# Patient Record
Sex: FEMALE | Race: WHITE | NOT HISPANIC OR LATINO | Employment: OTHER | URBAN - METROPOLITAN AREA
[De-identification: names, ages, dates, MRNs, and addresses within clinical notes are randomized per-mention and may not be internally consistent; named-entity substitution may affect disease eponyms.]

---

## 2019-08-25 PROBLEM — I10 HTN (HYPERTENSION): Status: ACTIVE | Noted: 2019-08-25

## 2019-08-25 PROBLEM — I34.1 MITRAL VALVE PROLAPSE: Status: ACTIVE | Noted: 2019-08-25

## 2019-08-25 PROBLEM — I48.91 ATRIAL FIBRILLATION WITH RVR: Status: ACTIVE | Noted: 2019-08-25

## 2021-05-12 ENCOUNTER — PATIENT MESSAGE (OUTPATIENT)
Dept: RESEARCH | Facility: HOSPITAL | Age: 61
End: 2021-05-12

## 2023-03-07 ENCOUNTER — OFFICE VISIT (OUTPATIENT)
Dept: PAIN MEDICINE | Facility: CLINIC | Age: 63
End: 2023-03-07
Payer: COMMERCIAL

## 2023-03-07 VITALS
HEART RATE: 52 BPM | SYSTOLIC BLOOD PRESSURE: 158 MMHG | BODY MASS INDEX: 27.75 KG/M2 | HEIGHT: 67 IN | DIASTOLIC BLOOD PRESSURE: 87 MMHG | WEIGHT: 176.81 LBS

## 2023-03-07 DIAGNOSIS — M54.2 CERVICALGIA: Primary | ICD-10-CM

## 2023-03-07 DIAGNOSIS — M47.812 CERVICAL SPONDYLOSIS: ICD-10-CM

## 2023-03-07 PROCEDURE — 99999 PR PBB SHADOW E&M-EST. PATIENT-LVL IV: ICD-10-PCS | Mod: PBBFAC,,, | Performed by: PHYSICIAN ASSISTANT

## 2023-03-07 PROCEDURE — 3079F DIAST BP 80-89 MM HG: CPT | Mod: CPTII,S$GLB,, | Performed by: PHYSICIAN ASSISTANT

## 2023-03-07 PROCEDURE — 99999 PR PBB SHADOW E&M-EST. PATIENT-LVL IV: CPT | Mod: PBBFAC,,, | Performed by: PHYSICIAN ASSISTANT

## 2023-03-07 PROCEDURE — 1159F PR MEDICATION LIST DOCUMENTED IN MEDICAL RECORD: ICD-10-PCS | Mod: CPTII,S$GLB,, | Performed by: PHYSICIAN ASSISTANT

## 2023-03-07 PROCEDURE — 99204 PR OFFICE/OUTPT VISIT, NEW, LEVL IV, 45-59 MIN: ICD-10-PCS | Mod: S$GLB,,, | Performed by: PHYSICIAN ASSISTANT

## 2023-03-07 PROCEDURE — 4010F PR ACE/ARB THEARPY RXD/TAKEN: ICD-10-PCS | Mod: CPTII,S$GLB,, | Performed by: PHYSICIAN ASSISTANT

## 2023-03-07 PROCEDURE — 4010F ACE/ARB THERAPY RXD/TAKEN: CPT | Mod: CPTII,S$GLB,, | Performed by: PHYSICIAN ASSISTANT

## 2023-03-07 PROCEDURE — 3079F PR MOST RECENT DIASTOLIC BLOOD PRESSURE 80-89 MM HG: ICD-10-PCS | Mod: CPTII,S$GLB,, | Performed by: PHYSICIAN ASSISTANT

## 2023-03-07 PROCEDURE — 3008F PR BODY MASS INDEX (BMI) DOCUMENTED: ICD-10-PCS | Mod: CPTII,S$GLB,, | Performed by: PHYSICIAN ASSISTANT

## 2023-03-07 PROCEDURE — 99204 OFFICE O/P NEW MOD 45 MIN: CPT | Mod: S$GLB,,, | Performed by: PHYSICIAN ASSISTANT

## 2023-03-07 PROCEDURE — 3008F BODY MASS INDEX DOCD: CPT | Mod: CPTII,S$GLB,, | Performed by: PHYSICIAN ASSISTANT

## 2023-03-07 PROCEDURE — 1160F RVW MEDS BY RX/DR IN RCRD: CPT | Mod: CPTII,S$GLB,, | Performed by: PHYSICIAN ASSISTANT

## 2023-03-07 PROCEDURE — 1159F MED LIST DOCD IN RCRD: CPT | Mod: CPTII,S$GLB,, | Performed by: PHYSICIAN ASSISTANT

## 2023-03-07 PROCEDURE — 1160F PR REVIEW ALL MEDS BY PRESCRIBER/CLIN PHARMACIST DOCUMENTED: ICD-10-PCS | Mod: CPTII,S$GLB,, | Performed by: PHYSICIAN ASSISTANT

## 2023-03-07 PROCEDURE — 3077F SYST BP >= 140 MM HG: CPT | Mod: CPTII,S$GLB,, | Performed by: PHYSICIAN ASSISTANT

## 2023-03-07 PROCEDURE — 3077F PR MOST RECENT SYSTOLIC BLOOD PRESSURE >= 140 MM HG: ICD-10-PCS | Mod: CPTII,S$GLB,, | Performed by: PHYSICIAN ASSISTANT

## 2023-03-07 RX ORDER — DRONEDARONE 400 MG/1
1 TABLET, FILM COATED ORAL 2 TIMES DAILY
COMMUNITY
Start: 2022-09-16 | End: 2023-04-18

## 2023-03-07 RX ORDER — OLMESARTAN MEDOXOMIL 40 MG/1
40 TABLET ORAL
COMMUNITY
Start: 2023-02-16

## 2023-03-07 RX ORDER — METHOCARBAMOL 500 MG/1
1000 TABLET, FILM COATED ORAL NIGHTLY PRN
Qty: 40 TABLET | Refills: 0 | Status: SHIPPED | OUTPATIENT
Start: 2023-03-07 | End: 2023-04-18 | Stop reason: ALTCHOICE

## 2023-03-07 RX ORDER — APIXABAN 5 MG/1
5 TABLET, FILM COATED ORAL 2 TIMES DAILY
COMMUNITY
Start: 2023-03-02

## 2023-03-07 NOTE — PROGRESS NOTES
[facility] New Patient Evaluation      Referred by: Dr. Shira Carlisle    PCP:     CC:   Chief Complaint   Patient presents with    Neck Pain     Pain radiates down into the right arm.      No flowsheet data found.      HPI:   Jessie López is a 62 y.o. female with history of atrial fibrillation, anxiety, fibromyalgia, migraine and MVP presents with neck and right arm pain.  She recalls a traumatic fall at age 13 and has felt chronic right sided body/ arm pain since then.  A few months ago she fell asleep in a car with her neck in a flexed postion and has felt some neck pain.  Current pain started 3/3/23 when she awoke in the morning, without any new trauma or triggering event.  Pain is felt in the right side of the neck to the right arm stopping at the elbow and associated with isolated wrist pain.  It is associated with numbness and tingling sensation as well.  She has tried tylenol and started medrol taper from ER  3-3-23.  Prescribed tramdol, but not started.  Has seen a chiropractor for the neck with dry needling reguularly.  She has done some core PT with ultrasound on the neck, but no focal neck PT.        Past and current medications:  Antineuropathics:  NSAIDs:  Antidepressants:  Muscle relaxers: robxin in the past, but not currently.  Opioids:  tramadol (has not started)  Antiplatelets/Anticoagulants:  eliquis (recently stopped coumadin)  Others:  medrol taper (started 3-3-23); tylenol    Physical therapy/ Chiropractic care:  PT for core strength with a session of ultrasound for the neck  Chirapractic care with dry needling for the neck    Pain Intervention History:  none    Past Spine Surgical History:  none      History:    Current Outpatient Medications:     acetaminophen (TYLENOL) 325 MG tablet, Take 2 tablets (650 mg total) by mouth every 6 (six) hours as needed for Pain., Disp: , Rfl: 0    ELIQUIS 5 mg Tab, Take 5 mg by mouth 2 (two) times daily., Disp: , Rfl:     LORazepam (ATIVAN)  0.5 MG tablet, Take 0.5 mg by mouth daily as needed for Anxiety., Disp: , Rfl:     MAGNESIUM MALATE, BULK, MISC, 1 capsule by Misc.(Non-Drug; Combo Route) route once daily., Disp: , Rfl:     methylPREDNISolone (MEDROL DOSEPACK) 4 mg tablet, use as directed, Disp: 21 each, Rfl: 0    metoprolol tartrate (LOPRESSOR) 50 MG tablet, Take 50 mg by mouth 2 (two) times daily., Disp: , Rfl:     MULTAQ 400 mg Tab, Take 1 tablet by mouth 2 (two) times daily., Disp: , Rfl:     olmesartan (BENICAR) 40 MG tablet, Take 40 mg by mouth., Disp: , Rfl:     traMADoL (ULTRAM) 50 mg tablet, Take 1 tablet (50 mg total) by mouth every 6 (six) hours as needed for Pain., Disp: 12 tablet, Rfl: 0    famotidine (PEPCID) 20 MG tablet, Take 1 tablet (20 mg total) by mouth 2 (two) times daily., Disp: 60 tablet, Rfl: 0    methocarbamol (ROBAXIN) 500 MG Tab, Take 1,000 mg by mouth 3 (three) times daily as needed., Disp: , Rfl:     warfarin (COUMADIN) 2.5 MG tablet, Take 2.5 mg by mouth once daily., Disp: , Rfl:     Past Medical History:   Diagnosis Date    Anxiety     Atrial fibrillation     Fibromyalgia     Migraine headache     MVP (mitral valve prolapse)        Past Surgical History:   Procedure Laterality Date    HYSTERECTOMY      TRANSESOPHAGEAL ECHOCARDIOGRAPHY N/A 8/27/2019    Procedure: ECHOCARDIOGRAM, TRANSESOPHAGEAL;  Surgeon: Raven Nicole MD;  Location: [facility];  Service: Cardiology;  Laterality: N/A;    TREATMENT OF CARDIAC ARRHYTHMIA N/A 8/27/2019    Procedure: CARDIOVERSION;  Surgeon: Raven Nicole MD;  Location: [facility];  Service: Cardiology;  Laterality: N/A;       No family history on file.    Social History     Socioeconomic History    Marital status:    Tobacco Use    Smoking status: Every Day     Types: Vaping w/o nicotine   Substance and Sexual Activity    Alcohol use: Never    Drug use: Never       Review of patient's allergies indicates:   Allergen Reactions    Imdur [isosorbide mononitrate]        Review of  "Systems:  Neck pain.  Right arm pain.  Balance of review of systems is negative.    Physical Exam:  Vitals:    03/07/23 0953   BP: (!) 158/87   Pulse: (!) 52   Weight: 80.2 kg (176 lb 12.9 oz)   Height: 5' 7" (1.702 m)   PainSc:   8   PainLoc: Arm     Body mass index is 27.69 kg/m².    Gen: NAD  Psych: mood appropriate for given condition  HEENT: eyes anicteric   CV: RRR, 2+ radial pulse  HEENT: anicteric   Respiratory: non-labored, no signs of respiratory distress  Abd: non-distended  Skin: warm, dry and intact.  Gait: Able to heel walk, toe walk. No antalgic gait.     Coordination:   Romberg: negative  Finger to nose coordination: normal  Heel to shin coordination: normal  Tandem walking coordination: normal    Cervical spine:   ROM is full in flexion, extension and lateral rotation without increased pain.  Spurling's maneuver causes no neck pain to either side.  Myofascial exam: No Tenderness to palpation across cervical paraspinous region bilaterally.  Right arm - deltoid, bicep, triceps with pain limited range of motion.    Lumbar spine:   ROM is full with flexion extension and oblique extension with no increased pain.    Randall's test causes no increased pain on either side.    Supine straight leg raise is negative bilaterally.    Internal and external rotation of the hip causes no increased pain on either side.  Myofascial exam: No tenderness to palpation across lumbar paraspinous muscles. No tenderness to palpation over the bilateral greater trochanters and bilateral SI joint    Sensory:  Intact and symmetrical to light touch in C4-T1 dermatomes bilaterally. Intact and symmetrical to light touch in L1-S1 dermatomes bilaterally.    Motor:    Right Left   C4 Shoulder Abduction  4  5   C5 Elbow Flexion    4  5   C6 Wrist Extension  4  5   C7 Elbow Extension   4  5   C8/T1 Hand Intrinsics   5  5        Right Left   L2/3 Iliacus Hip flexion  5  5   L3/4 Qudratus Femoris Knee Extension  5  5   L4/5 Tib Anterior " Ankle Dorsiflexion   5  5   L5/S1 Extensor Hallicus Longus Great toe extension  5  5   S1/S2 Gastroc/Soleus Plantar Flexion  5  5      Right Left   Triceps DTR 2+ 2+   Biceps DTR 2+ 2+   Brachioradialis DTR 2+ 2+   Patellar DTR 2+ 2+   Achilles DTR 2+ 2+   Rodriguez Absent  Absent   Clonus Absent Absent   Babinski Absent Absent     Imaging:    CT cervical spine 3/3/23:  Vertebral body heights are preserved.  There is grade 1 anterolisthesis C2 on C3 and C3 on C4.  Disc space narrowing and marginal osteophytosis is most notable at C4-C5 through C6-C7.  There is multilevel facet arthropathy.  Degenerative changes are noted of the atlantoaxial articulation which is otherwise intact.  No acute displaced fractures are identified.  There is multilevel osseous neural foraminal narrowing.  No significant osseous spinal canal narrowing noted.    Labs:  Lab Results   Component Value Date    HGBA1C 5.3 08/25/2019       Lab Results   Component Value Date    WBC 8.77 04/09/2021    HGB 12.6 04/09/2021    HCT 39.4 04/09/2021    MCV 83 04/09/2021     04/09/2021           Assessment:     Ms. Roman has chronic right sided body pain with new/ different onset right neck and arm pain.  She has cervical spondylosis greatest C4/5, C5/6, C6/7 with foraminal narrowing that could cause right arm radicular pain.  We dicussed PT, medications, and obtaining MRI to consider interventional procedures, surgical intervention.  She would like to try PT and medications.  Complete medrol taper, may take tramadol for pain I needed, I have refillef robaxin.  Referred to PT.  Follow up in 6 weeks to monitor progress. Call sooner if no improvement or worsenting to pursue MRI and interventional procedures.  She is agreeable.      Problem List Items Addressed This Visit    None        Follow Up: RTC 6 weeks.    : Not viewed.          Elli Eric PA-C  [facility]      This note was completed with dictation software and  grammatical errors may exist.

## 2023-04-18 ENCOUNTER — OFFICE VISIT (OUTPATIENT)
Dept: PAIN MEDICINE | Facility: CLINIC | Age: 63
End: 2023-04-18
Payer: COMMERCIAL

## 2023-04-18 VITALS
HEART RATE: 63 BPM | HEIGHT: 67 IN | DIASTOLIC BLOOD PRESSURE: 87 MMHG | WEIGHT: 176.13 LBS | BODY MASS INDEX: 27.64 KG/M2 | SYSTOLIC BLOOD PRESSURE: 145 MMHG

## 2023-04-18 DIAGNOSIS — M47.812 CERVICAL SPONDYLOSIS: Primary | ICD-10-CM

## 2023-04-18 DIAGNOSIS — M54.2 CERVICALGIA: ICD-10-CM

## 2023-04-18 PROCEDURE — 99999 PR PBB SHADOW E&M-EST. PATIENT-LVL III: CPT | Mod: PBBFAC,,, | Performed by: PHYSICIAN ASSISTANT

## 2023-04-18 PROCEDURE — 3079F PR MOST RECENT DIASTOLIC BLOOD PRESSURE 80-89 MM HG: ICD-10-PCS | Mod: CPTII,S$GLB,, | Performed by: PHYSICIAN ASSISTANT

## 2023-04-18 PROCEDURE — 3008F BODY MASS INDEX DOCD: CPT | Mod: CPTII,S$GLB,, | Performed by: PHYSICIAN ASSISTANT

## 2023-04-18 PROCEDURE — 1160F RVW MEDS BY RX/DR IN RCRD: CPT | Mod: CPTII,S$GLB,, | Performed by: PHYSICIAN ASSISTANT

## 2023-04-18 PROCEDURE — 4010F PR ACE/ARB THEARPY RXD/TAKEN: ICD-10-PCS | Mod: CPTII,S$GLB,, | Performed by: PHYSICIAN ASSISTANT

## 2023-04-18 PROCEDURE — 3077F SYST BP >= 140 MM HG: CPT | Mod: CPTII,S$GLB,, | Performed by: PHYSICIAN ASSISTANT

## 2023-04-18 PROCEDURE — 3008F PR BODY MASS INDEX (BMI) DOCUMENTED: ICD-10-PCS | Mod: CPTII,S$GLB,, | Performed by: PHYSICIAN ASSISTANT

## 2023-04-18 PROCEDURE — 3077F PR MOST RECENT SYSTOLIC BLOOD PRESSURE >= 140 MM HG: ICD-10-PCS | Mod: CPTII,S$GLB,, | Performed by: PHYSICIAN ASSISTANT

## 2023-04-18 PROCEDURE — 99213 OFFICE O/P EST LOW 20 MIN: CPT | Mod: S$GLB,,, | Performed by: PHYSICIAN ASSISTANT

## 2023-04-18 PROCEDURE — 99213 PR OFFICE/OUTPT VISIT, EST, LEVL III, 20-29 MIN: ICD-10-PCS | Mod: S$GLB,,, | Performed by: PHYSICIAN ASSISTANT

## 2023-04-18 PROCEDURE — 4010F ACE/ARB THERAPY RXD/TAKEN: CPT | Mod: CPTII,S$GLB,, | Performed by: PHYSICIAN ASSISTANT

## 2023-04-18 PROCEDURE — 1160F PR REVIEW ALL MEDS BY PRESCRIBER/CLIN PHARMACIST DOCUMENTED: ICD-10-PCS | Mod: CPTII,S$GLB,, | Performed by: PHYSICIAN ASSISTANT

## 2023-04-18 PROCEDURE — 1159F MED LIST DOCD IN RCRD: CPT | Mod: CPTII,S$GLB,, | Performed by: PHYSICIAN ASSISTANT

## 2023-04-18 PROCEDURE — 3079F DIAST BP 80-89 MM HG: CPT | Mod: CPTII,S$GLB,, | Performed by: PHYSICIAN ASSISTANT

## 2023-04-18 PROCEDURE — 99999 PR PBB SHADOW E&M-EST. PATIENT-LVL III: ICD-10-PCS | Mod: PBBFAC,,, | Performed by: PHYSICIAN ASSISTANT

## 2023-04-18 PROCEDURE — 1159F PR MEDICATION LIST DOCUMENTED IN MEDICAL RECORD: ICD-10-PCS | Mod: CPTII,S$GLB,, | Performed by: PHYSICIAN ASSISTANT

## 2023-04-18 RX ORDER — TIZANIDINE 4 MG/1
4 TABLET ORAL EVERY 12 HOURS PRN
Qty: 40 TABLET | Refills: 0 | Status: SHIPPED | OUTPATIENT
Start: 2023-04-18 | End: 2023-07-20

## 2023-04-18 NOTE — PROGRESS NOTES
"[facility] Follow Up        PCP:   none listed    CC:   Chief Complaint   Patient presents with    Follow-up     Still having neck pain, because she had heart surgery and was not cleared to do physical therapy.      No flowsheet data found.      HPI:     Ms. Rockwell presents today for follow up of neck and right arm pain.  She was able to go to 3-4 visits of PT, but had to hold due to cardiac ablation procedure.  She has now been cleared from cardiology to restart PT and she plans to do so.  Arm pain has reolved.  She does have intermittent numbness/ tingling in the right arm particularly with bending the elbows or with laying with arms curled next to her at night; resolves with "shaking the arms out".  She continues with neck pain.      Initial HPI:  Jessie López is a 62 y.o. female with history of atrial fibrillation, anxiety, fibromyalgia, migraine and MVP presents with neck and right arm pain.  She recalls a traumatic fall at age 13 and has felt chronic right sided body/ arm pain since then.  A few months ago she fell asleep in a car with her neck in a flexed postion and has felt some neck pain.  Current pain started 3/3/23 when she awoke in the morning, without any new trauma or triggering event.  Pain is felt in the right side of the neck to the right arm stopping at the elbow and associated with isolated wrist pain.  It is associated with numbness and tingling sensation as well.  She has tried tylenol and started medrol taper from ER  3-3-23.  Prescribed tramdol, but not started.  Has seen a chiropractor for the neck with dry needling reguularly.  She has done some core PT with ultrasound on the neck, but no focal neck PT.        Past and current medications:  Antineuropathics:  NSAIDs:  Antidepressants:  Muscle relaxers: robxin in the past, but not currently.  Opioids:  tramadol (has not started)  Antiplatelets/Anticoagulants:  eliquis (recently stopped coumadin)  Others:  medrol taper (started " 3-3-23); tylenol    Physical therapy/ Chiropractic care:  PT for neck 3-4 visits recently and plans to restart.  PT for core strength with a session of ultrasound for the neck  Chirapractic care with dry needling for the neck    Pain Intervention History:  none    Past Spine Surgical History:  none      History:    Current Outpatient Medications:     acetaminophen (TYLENOL) 325 MG tablet, Take 2 tablets (650 mg total) by mouth every 6 (six) hours as needed for Pain., Disp: , Rfl: 0    ELIQUIS 5 mg Tab, Take 5 mg by mouth 2 (two) times daily., Disp: , Rfl:     LORazepam (ATIVAN) 0.5 MG tablet, Take 0.5 mg by mouth daily as needed for Anxiety., Disp: , Rfl:     MAGNESIUM MALATE, BULK, MISC, 1 capsule by Misc.(Non-Drug; Combo Route) route as needed., Disp: , Rfl:     metoprolol tartrate (LOPRESSOR) 50 MG tablet, Take 50 mg by mouth Daily., Disp: , Rfl:     olmesartan (BENICAR) 40 MG tablet, Take 40 mg by mouth., Disp: , Rfl:     MULTAQ 400 mg Tab, Take 1 tablet by mouth 2 (two) times daily., Disp: , Rfl:     tiZANidine (ZANAFLEX) 4 MG tablet, Take 1 tablet (4 mg total) by mouth every 12 (twelve) hours as needed (muscle spasms/ pain)., Disp: 40 tablet, Rfl: 0    traMADoL (ULTRAM) 50 mg tablet, Take 1 tablet (50 mg total) by mouth every 6 (six) hours as needed for Pain., Disp: 12 tablet, Rfl: 0    Past Medical History:   Diagnosis Date    Anxiety     Atrial fibrillation     Fibromyalgia     Migraine headache     MVP (mitral valve prolapse)        Past Surgical History:   Procedure Laterality Date    HYSTERECTOMY      TRANSESOPHAGEAL ECHOCARDIOGRAPHY N/A 8/27/2019    Procedure: ECHOCARDIOGRAM, TRANSESOPHAGEAL;  Surgeon: Raven Nicole MD;  Location: [facility];  Service: Cardiology;  Laterality: N/A;    TREATMENT OF CARDIAC ARRHYTHMIA N/A 8/27/2019    Procedure: CARDIOVERSION;  Surgeon: Raven Nicole MD;  Location: [facility];  Service: Cardiology;  Laterality: N/A;       No family history on file.    Social History  "    Socioeconomic History    Marital status:    Tobacco Use    Smoking status: Every Day     Types: Vaping w/o nicotine   Substance and Sexual Activity    Alcohol use: Never    Drug use: Never       Review of patient's allergies indicates:   Allergen Reactions    Imdur [isosorbide mononitrate]        Review of Systems:  Neck pain.  Right arm pain.  Balance of review of systems is negative.    Physical Exam:  Vitals:    04/18/23 1033   BP: (!) 145/87   Pulse: 63   Weight: 79.9 kg (176 lb 2.4 oz)   Height: 5' 7" (1.702 m)   PainSc:   4   PainLoc: Neck     Body mass index is 27.59 kg/m².    Gen: NAD  Psych: mood appropriate for given condition  HEENT: eyes anicteric   CV: RRR, 2+ radial pulse  HEENT: anicteric   Respiratory: non-labored, no signs of respiratory distress  Abd: non-distended  Skin: warm, dry and intact.  Gait: Able to heel walk, toe walk. No antalgic gait.     Coordination:   Romberg: negative  Finger to nose coordination: normal  Heel to shin coordination: normal  Tandem walking coordination: normal    Cervical spine:   ROM is full in flexion, extension and lateral rotation without increased pain.  Spurling's maneuver causes no neck pain to either side.  Myofascial exam: No Tenderness to palpation across cervical paraspinous region bilaterally.  Right arm - deltoid, bicep, triceps with pain limited range of motion.    Lumbar spine:   ROM is full with flexion extension and oblique extension with no increased pain.    Randall's test causes no increased pain on either side.    Supine straight leg raise is negative bilaterally.    Internal and external rotation of the hip causes no increased pain on either side.  Myofascial exam: No tenderness to palpation across lumbar paraspinous muscles. No tenderness to palpation over the bilateral greater trochanters and bilateral SI joint    Sensory:  Intact and symmetrical to light touch in C4-T1 dermatomes bilaterally. Intact and symmetrical to light touch in " L1-S1 dermatomes bilaterally.    Motor:    Right Left   C4 Shoulder Abduction  4  5   C5 Elbow Flexion    4  5   C6 Wrist Extension  4  5   C7 Elbow Extension   4  5   C8/T1 Hand Intrinsics   5  5        Right Left   L2/3 Iliacus Hip flexion  5  5   L3/4 Qudratus Femoris Knee Extension  5  5   L4/5 Tib Anterior Ankle Dorsiflexion   5  5   L5/S1 Extensor Hallicus Longus Great toe extension  5  5   S1/S2 Gastroc/Soleus Plantar Flexion  5  5      Right Left   Triceps DTR 2+ 2+   Biceps DTR 2+ 2+   Brachioradialis DTR 2+ 2+   Patellar DTR 2+ 2+   Achilles DTR 2+ 2+   Rodriguez Absent  Absent   Clonus Absent Absent   Babinski Absent Absent     Imaging:    CT cervical spine 3/3/23:  Vertebral body heights are preserved.  There is grade 1 anterolisthesis C2 on C3 and C3 on C4.  Disc space narrowing and marginal osteophytosis is most notable at C4-C5 through C6-C7.  There is multilevel facet arthropathy.  Degenerative changes are noted of the atlantoaxial articulation which is otherwise intact.  No acute displaced fractures are identified.  There is multilevel osseous neural foraminal narrowing.  No significant osseous spinal canal narrowing noted.    Labs:  Lab Results   Component Value Date    HGBA1C 5.3 08/25/2019       Lab Results   Component Value Date    WBC 8.77 04/09/2021    HGB 12.6 04/09/2021    HCT 39.4 04/09/2021    MCV 83 04/09/2021     04/09/2021           Assessment:     Ms. Roman has chronic right sided body pain with new/ different onset right neck and arm pain.  She has cervical spondylosis greatest C4/5, C5/6, C6/7 with foraminal narrowing that could cause right arm radicular pain.  Right arm radicular pain has resolved.  Current intermittent numbness/ tingling in the arms more consistent with ulnar neuropathy and carpal tunnel.  Neck pain from degenerative chagnes in the spine.  I do suspect she will continue to improve with PT regarding neck pain.  I have encouraged PT ad home exercise.  If no  further benefit with PT, then proceed with MRI to consider interventional procedures.  She is agreeable to plan.  Follow up as needed.  I refilled zanaflex per her request.    Problem List Items Addressed This Visit    None  Visit Diagnoses       Cervical spondylosis    -  Primary    Relevant Medications    tiZANidine (ZANAFLEX) 4 MG tablet    Cervicalgia        Relevant Medications    tiZANidine (ZANAFLEX) 4 MG tablet              Follow Up: RTC as needed.     : Not viewed.          Elli Eric PA-C  [facility]      This note was completed with dictation software and grammatical errors may exist.

## 2023-04-26 ENCOUNTER — PATIENT MESSAGE (OUTPATIENT)
Dept: PAIN MEDICINE | Facility: CLINIC | Age: 63
End: 2023-04-26
Payer: COMMERCIAL

## 2023-04-26 DIAGNOSIS — M47.812 CERVICAL SPONDYLOSIS: Primary | ICD-10-CM

## 2023-04-26 DIAGNOSIS — M54.2 CERVICALGIA: ICD-10-CM

## 2023-06-22 ENCOUNTER — PATIENT MESSAGE (OUTPATIENT)
Dept: PAIN MEDICINE | Facility: CLINIC | Age: 63
End: 2023-06-22
Payer: COMMERCIAL

## 2023-06-23 ENCOUNTER — TELEPHONE (OUTPATIENT)
Dept: PAIN MEDICINE | Facility: CLINIC | Age: 63
End: 2023-06-23
Payer: COMMERCIAL

## 2023-06-23 NOTE — TELEPHONE ENCOUNTER
I called Mrs. López regarding an appointment with Elli Eric, got voicemail, call back number was provided.

## 2023-06-23 NOTE — TELEPHONE ENCOUNTER
I spoke with Mrs. López and she said she is doing well and does not need to see Elli Eric at this time. She will call if she needs an appointment in the future.

## 2023-06-23 NOTE — TELEPHONE ENCOUNTER
If she is still having pain, numbness, any issues she wants to discuss then she should schedule an appointment.

## 2023-07-13 ENCOUNTER — PATIENT MESSAGE (OUTPATIENT)
Dept: PAIN MEDICINE | Facility: CLINIC | Age: 63
End: 2023-07-13
Payer: COMMERCIAL

## 2023-07-13 ENCOUNTER — TELEPHONE (OUTPATIENT)
Dept: PHYSICAL MEDICINE AND REHAB | Facility: CLINIC | Age: 63
End: 2023-07-13
Payer: COMMERCIAL

## 2023-07-13 NOTE — TELEPHONE ENCOUNTER
----- Message from Adia Berkowitz sent at 7/13/2023 10:26 AM CDT -----  Contact: Pt  Type:Patient Returning call    Who called:Pt  Who left the message for patient:Nurse lazaro  Does the patient know what this was regarding:Yes, neck pain  Best call back number:[phone]    Additional Information Please Advise -Thank you

## 2023-07-20 ENCOUNTER — OFFICE VISIT (OUTPATIENT)
Dept: PAIN MEDICINE | Facility: CLINIC | Age: 63
End: 2023-07-20
Payer: COMMERCIAL

## 2023-07-20 VITALS
HEART RATE: 70 BPM | WEIGHT: 169.19 LBS | HEIGHT: 67 IN | BODY MASS INDEX: 26.55 KG/M2 | DIASTOLIC BLOOD PRESSURE: 84 MMHG | SYSTOLIC BLOOD PRESSURE: 136 MMHG

## 2023-07-20 DIAGNOSIS — M47.812 CERVICAL SPONDYLOSIS: Primary | ICD-10-CM

## 2023-07-20 DIAGNOSIS — M54.2 CERVICALGIA: ICD-10-CM

## 2023-07-20 DIAGNOSIS — G56.21 ULNAR NEUROPATHY OF RIGHT UPPER EXTREMITY: ICD-10-CM

## 2023-07-20 DIAGNOSIS — G56.03 BILATERAL CARPAL TUNNEL SYNDROME: ICD-10-CM

## 2023-07-20 PROCEDURE — 1159F MED LIST DOCD IN RCRD: CPT | Mod: CPTII,S$GLB,, | Performed by: PHYSICIAN ASSISTANT

## 2023-07-20 PROCEDURE — 99213 OFFICE O/P EST LOW 20 MIN: CPT | Mod: S$GLB,,, | Performed by: PHYSICIAN ASSISTANT

## 2023-07-20 PROCEDURE — 1159F PR MEDICATION LIST DOCUMENTED IN MEDICAL RECORD: ICD-10-PCS | Mod: CPTII,S$GLB,, | Performed by: PHYSICIAN ASSISTANT

## 2023-07-20 PROCEDURE — 4010F PR ACE/ARB THEARPY RXD/TAKEN: ICD-10-PCS | Mod: CPTII,S$GLB,, | Performed by: PHYSICIAN ASSISTANT

## 2023-07-20 PROCEDURE — 3008F PR BODY MASS INDEX (BMI) DOCUMENTED: ICD-10-PCS | Mod: CPTII,S$GLB,, | Performed by: PHYSICIAN ASSISTANT

## 2023-07-20 PROCEDURE — 1160F PR REVIEW ALL MEDS BY PRESCRIBER/CLIN PHARMACIST DOCUMENTED: ICD-10-PCS | Mod: CPTII,S$GLB,, | Performed by: PHYSICIAN ASSISTANT

## 2023-07-20 PROCEDURE — 1160F RVW MEDS BY RX/DR IN RCRD: CPT | Mod: CPTII,S$GLB,, | Performed by: PHYSICIAN ASSISTANT

## 2023-07-20 PROCEDURE — 99213 PR OFFICE/OUTPT VISIT, EST, LEVL III, 20-29 MIN: ICD-10-PCS | Mod: S$GLB,,, | Performed by: PHYSICIAN ASSISTANT

## 2023-07-20 PROCEDURE — 3079F PR MOST RECENT DIASTOLIC BLOOD PRESSURE 80-89 MM HG: ICD-10-PCS | Mod: CPTII,S$GLB,, | Performed by: PHYSICIAN ASSISTANT

## 2023-07-20 PROCEDURE — 3079F DIAST BP 80-89 MM HG: CPT | Mod: CPTII,S$GLB,, | Performed by: PHYSICIAN ASSISTANT

## 2023-07-20 PROCEDURE — 99999 PR PBB SHADOW E&M-EST. PATIENT-LVL III: ICD-10-PCS | Mod: PBBFAC,,, | Performed by: PHYSICIAN ASSISTANT

## 2023-07-20 PROCEDURE — 3008F BODY MASS INDEX DOCD: CPT | Mod: CPTII,S$GLB,, | Performed by: PHYSICIAN ASSISTANT

## 2023-07-20 PROCEDURE — 3075F PR MOST RECENT SYSTOLIC BLOOD PRESS GE 130-139MM HG: ICD-10-PCS | Mod: CPTII,S$GLB,, | Performed by: PHYSICIAN ASSISTANT

## 2023-07-20 PROCEDURE — 4010F ACE/ARB THERAPY RXD/TAKEN: CPT | Mod: CPTII,S$GLB,, | Performed by: PHYSICIAN ASSISTANT

## 2023-07-20 PROCEDURE — 3075F SYST BP GE 130 - 139MM HG: CPT | Mod: CPTII,S$GLB,, | Performed by: PHYSICIAN ASSISTANT

## 2023-07-20 PROCEDURE — 99999 PR PBB SHADOW E&M-EST. PATIENT-LVL III: CPT | Mod: PBBFAC,,, | Performed by: PHYSICIAN ASSISTANT

## 2023-07-20 NOTE — PROGRESS NOTES
"[facility]        PCP:   none listed    CC:   Chief Complaint   Patient presents with    Neck Pain     With left arm weakness.      No flowsheet data found.      HPI:     Ms. Rockwell presents today for follow up of neck and arm pain.  At the time of her last visit PT was helping with neck and right arm pain.  She has continued with home exercise.  She was doing fairly well until 7-12-23 without traumatic event she developed acute exacerbation of neck pain and left arm pain/ weakness.  She takes tylenol of pain.  She has held on home exercises due to severity of pain, but pain has since improved and she does plan to resume exercise. Currenlty neck and left arm pain have significantly improvement.  No current weaknes.  She had chronic intermittent numbness/ tingling in the right arm greater than left arm with bending the elbows or with laying with arms curled next to her at night; resolves with "shaking the arms out".      Initial HPI:  Jessie López is a 63 y.o. female with history of atrial fibrillation, anxiety, fibromyalgia, migraine and MVP presents with neck and right arm pain.  She recalls a traumatic fall at age 13 and has felt chronic right sided body/ arm pain since then.  A few months ago she fell asleep in a car with her neck in a flexed postion and has felt some neck pain.  Current pain started 3/3/23 when she awoke in the morning, without any new trauma or triggering event.  Pain is felt in the right side of the neck to the right arm stopping at the elbow and associated with isolated wrist pain.  It is associated with numbness and tingling sensation as well.  She has tried tylenol and started medrol taper from ER  3-3-23.  Prescribed tramdol, but not started.  Has seen a chiropractor for the neck with dry needling reguularly.  She has done some core PT with ultrasound on the neck, but no focal neck PT.        Past and current " medications:  Antineuropathics:  NSAIDs:  Antidepressants:  Muscle relaxers: robxin in the past, but not currently.  Opioids:  tramadol  Antiplatelets/Anticoagulants:  eliquis (recently stopped coumadin)  Others:  medrol taper (started 3-3-23); tylenol    Physical therapy/ Chiropractic care:  PT for neck 3-4 in early 2023  PT for core strength with a session of ultrasound for the neck  Chirapractic care with dry needling for the neck    Pain Intervention History:  none    Past Spine Surgical History:  none      History:    Current Outpatient Medications:     acetaminophen (TYLENOL) 325 MG tablet, Take 2 tablets (650 mg total) by mouth every 6 (six) hours as needed for Pain., Disp: , Rfl: 0    ELIQUIS 5 mg Tab, Take 5 mg by mouth 2 (two) times daily., Disp: , Rfl:     LORazepam (ATIVAN) 0.5 MG tablet, Take 0.5 mg by mouth daily as needed for Anxiety., Disp: , Rfl:     metoprolol tartrate (LOPRESSOR) 50 MG tablet, Take 50 mg by mouth Daily., Disp: , Rfl:     olmesartan (BENICAR) 40 MG tablet, Take 40 mg by mouth., Disp: , Rfl:     MAGNESIUM MALATE, BULK, MISC, 1 capsule by Misc.(Non-Drug; Combo Route) route as needed., Disp: , Rfl:     tiZANidine (ZANAFLEX) 4 MG tablet, Take 1 tablet (4 mg total) by mouth every 12 (twelve) hours as needed (muscle spasms/ pain)., Disp: 40 tablet, Rfl: 0    Past Medical History:   Diagnosis Date    Anxiety     Atrial fibrillation     Fibromyalgia     Migraine headache     MVP (mitral valve prolapse)        Past Surgical History:   Procedure Laterality Date    HYSTERECTOMY      TRANSESOPHAGEAL ECHOCARDIOGRAPHY N/A 8/27/2019    Procedure: ECHOCARDIOGRAM, TRANSESOPHAGEAL;  Surgeon: Raven Nicole MD;  Location: [facility];  Service: Cardiology;  Laterality: N/A;    TREATMENT OF CARDIAC ARRHYTHMIA N/A 8/27/2019    Procedure: CARDIOVERSION;  Surgeon: Raven Nicole MD;  Location: [facility];  Service: Cardiology;  Laterality: N/A;       History reviewed. No pertinent family history.    Social  "History     Socioeconomic History    Marital status:    Tobacco Use    Smoking status: Every Day     Types: Vaping w/o nicotine   Substance and Sexual Activity    Alcohol use: Never    Drug use: Never       Review of patient's allergies indicates:   Allergen Reactions    Imdur [isosorbide mononitrate]        Review of Systems:  Neck pain.  Right arm pain.  Balance of review of systems is negative.    Physical Exam:  Vitals:    07/20/23 1015   BP: 136/84   Pulse: 70   Weight: 76.8 kg (169 lb 3.3 oz)   Height: 5' 7" (1.702 m)   PainSc: 0-No pain     Body mass index is 26.5 kg/m².    Gen: NAD  Psych: mood appropriate for given condition  HEENT: eyes anicteric   CV: RRR, 2+ radial pulse  HEENT: anicteric   Respiratory: non-labored, no signs of respiratory distress  Abd: non-distended  Skin: warm, dry and intact.  Gait: Able to heel walk, toe walk. No antalgic gait.     Coordination:   Romberg: negative  Finger to nose coordination: normal  Heel to shin coordination: normal  Tandem walking coordination: normal    Cervical spine:   ROM is full in flexion, extension and lateral rotation without increased pain.  Spurling's maneuver causes no neck pain to either side.  Myofascial exam: No Tenderness to palpation across cervical paraspinous region bilaterally.  Right arm - deltoid, bicep, triceps with pain limited range of motion.    (+) phalens and tinels at the right wrist for carpal tunnel  (+) phalens at the right elbow for ulnar neuropathy    Lumbar spine:   ROM is full with flexion extension and oblique extension with no increased pain.    Randall's test causes no increased pain on either side.    Supine straight leg raise is negative bilaterally.    Internal and external rotation of the hip causes no increased pain on either side.  Myofascial exam: No tenderness to palpation across lumbar paraspinous muscles. No tenderness to palpation over the bilateral greater trochanters and bilateral SI " joint    Sensory:  Intact and symmetrical to light touch in C4-T1 dermatomes bilaterally. Intact and symmetrical to light touch in L1-S1 dermatomes bilaterally.    Motor:    Right Left   C4 Shoulder Abduction  4  5   C5 Elbow Flexion    4  5   C6 Wrist Extension  4  5   C7 Elbow Extension   4  5   C8/T1 Hand Intrinsics   5  5        Right Left   L2/3 Iliacus Hip flexion  5  5   L3/4 Qudratus Femoris Knee Extension  5  5   L4/5 Tib Anterior Ankle Dorsiflexion   5  5   L5/S1 Extensor Hallicus Longus Great toe extension  5  5   S1/S2 Gastroc/Soleus Plantar Flexion  5  5      Right Left   Triceps DTR 2+ 2+   Biceps DTR 2+ 2+   Brachioradialis DTR 2+ 2+   Patellar DTR 2+ 2+   Achilles DTR 2+ 2+   Rodriguez Absent  Absent   Clonus Absent Absent   Babinski Absent Absent     Imaging:    CT cervical spine 3/3/23:  Vertebral body heights are preserved.  There is grade 1 anterolisthesis C2 on C3 and C3 on C4.  Disc space narrowing and marginal osteophytosis is most notable at C4-C5 through C6-C7.  There is multilevel facet arthropathy.  Degenerative changes are noted of the atlantoaxial articulation which is otherwise intact.  No acute displaced fractures are identified.  There is multilevel osseous neural foraminal narrowing.  No significant osseous spinal canal narrowing noted.    Labs:  Lab Results   Component Value Date    HGBA1C 5.3 08/25/2019       Lab Results   Component Value Date    WBC 8.77 04/09/2021    HGB 12.6 04/09/2021    HCT 39.4 04/09/2021    MCV 83 04/09/2021     04/09/2021           Assessment:     Ms. Roman has improving acute exacerbation of chornic neck pain and acute improving left arm pain/ weakness.She has cervical spondylosis greatest C4/5, C5/6, C6/7 with foraminal narrowing that could cause cervical radicular pain.  Acute exacerbation has improved.   Neck pain from degenerative chagnes in the spine.  I recommend she resume home PT exercises daily.  Discussed managing pain with PT and home  exercises.  If pain becumes significnat enough we can proceed with MRI of the neck and consider interventional procedures and if she is a surgical candidate.  She would like to continue on her own for now     Current intermittent numbness/ tingling in the hands more consistent with ulnar neuropathy and carpal tunnel.  - discussed carpal tunnel and ulnar nerve splints, vs carpal tunnel injections and surgical options (ulnar nerve decompression, carpal tunnel release).  She wants to try activity modification and splinting.  She has splints a gabe.    Follow up as needed.     Problem List Items Addressed This Visit    None  Visit Diagnoses       Cervical spondylosis    -  Primary    Cervicalgia        Bilateral carpal tunnel syndrome        Ulnar neuropathy of right upper extremity                    Follow Up: RTC as needed.     : Not viewed.          Elli Eric PA-C  [facility]      This note was completed with dictation software and grammatical errors may exist.